# Patient Record
Sex: FEMALE | Race: OTHER | HISPANIC OR LATINO | URBAN - METROPOLITAN AREA
[De-identification: names, ages, dates, MRNs, and addresses within clinical notes are randomized per-mention and may not be internally consistent; named-entity substitution may affect disease eponyms.]

---

## 2024-10-09 NOTE — ASU PATIENT PROFILE, ADULT - NSICDXPASTSURGICALHX_GEN_ALL_CORE_FT
PAST SURGICAL HISTORY:  H/O hernia repair 37 years ago    S/P LASIK surgery      PAST SURGICAL HISTORY:  Bilateral cataracts     H/O hernia repair 37 years ago

## 2024-10-09 NOTE — ASU PATIENT PROFILE, ADULT - PAIN CHRONIC, PROFILE
We are committed to providing our patients with their test results in a timely manner. If you have access to [employer], you will receive your results within 5 to 7 days. If your results are normal, a member of our office may call you or you may receive a letter in the mail within 10 to 15 days of your testing. If your results have something additional to discuss, a member of our office will contact you by phone. If at any time you have questions related your results, please feel free to call our office at [phone]  
no

## 2024-10-09 NOTE — ASU PATIENT PROFILE, ADULT - NSICDXPASTMEDICALHX_GEN_ALL_CORE_FT
PAST MEDICAL HISTORY:  Arthritis with colds    Asthma     Migraines      PAST MEDICAL HISTORY:  Arthritis     Asthma with colds    Migraines

## 2024-10-10 ENCOUNTER — OUTPATIENT (OUTPATIENT)
Dept: INPATIENT UNIT | Facility: HOSPITAL | Age: 74
LOS: 1 days | End: 2024-10-10
Payer: MEDICARE

## 2024-10-10 VITALS
RESPIRATION RATE: 16 BRPM | WEIGHT: 154.1 LBS | SYSTOLIC BLOOD PRESSURE: 123 MMHG | DIASTOLIC BLOOD PRESSURE: 73 MMHG | HEART RATE: 77 BPM | OXYGEN SATURATION: 98 % | HEIGHT: 55 IN | TEMPERATURE: 98 F

## 2024-10-10 VITALS
SYSTOLIC BLOOD PRESSURE: 123 MMHG | TEMPERATURE: 98 F | DIASTOLIC BLOOD PRESSURE: 73 MMHG | HEIGHT: 55 IN | HEART RATE: 77 BPM | OXYGEN SATURATION: 98 % | WEIGHT: 154.1 LBS | RESPIRATION RATE: 16 BRPM

## 2024-10-10 DIAGNOSIS — H26.9 UNSPECIFIED CATARACT: Chronic | ICD-10-CM

## 2024-10-10 DIAGNOSIS — Z98.890 OTHER SPECIFIED POSTPROCEDURAL STATES: Chronic | ICD-10-CM

## 2024-10-10 LAB
BLD GP AB SCN SERPL QL: NEGATIVE — SIGNIFICANT CHANGE UP
RH IG SCN BLD-IMP: POSITIVE — SIGNIFICANT CHANGE UP

## 2024-10-10 PROCEDURE — 86850 RBC ANTIBODY SCREEN: CPT

## 2024-10-10 PROCEDURE — 86901 BLOOD TYPING SEROLOGIC RH(D): CPT

## 2024-10-10 PROCEDURE — 86900 BLOOD TYPING SEROLOGIC ABO: CPT

## 2024-10-10 RX ORDER — ACETAMINOPHEN 325 MG
2 TABLET ORAL
Refills: 0 | DISCHARGE

## 2024-10-10 NOTE — PRE-ANESTHESIA EVALUATION ADULT - NSANTHOSAYNRD_GEN_A_CORE
No. DENISA screening performed.  STOP BANG Legend: 0-2 = LOW Risk; 3-4 = INTERMEDIATE Risk; 5-8 = HIGH Risk

## 2024-10-15 PROBLEM — J45.909 UNSPECIFIED ASTHMA, UNCOMPLICATED: Chronic | Status: ACTIVE | Noted: 2024-10-09

## 2024-10-15 PROBLEM — M19.90 UNSPECIFIED OSTEOARTHRITIS, UNSPECIFIED SITE: Chronic | Status: ACTIVE | Noted: 2024-10-09

## 2024-10-15 PROBLEM — G43.909 MIGRAINE, UNSPECIFIED, NOT INTRACTABLE, WITHOUT STATUS MIGRAINOSUS: Chronic | Status: ACTIVE | Noted: 2024-10-09

## 2024-10-16 RX ORDER — ALBUTEROL 90 MCG
2 AEROSOL (GRAM) INHALATION
Refills: 0 | DISCHARGE

## 2024-10-16 RX ORDER — ATORVASTATIN CALCIUM 10 MG/1
1 TABLET, FILM COATED ORAL
Refills: 0 | DISCHARGE

## 2024-10-16 RX ORDER — MONTELUKAST SODIUM 10 MG/1
1 TABLET, FILM COATED ORAL
Refills: 0 | DISCHARGE

## 2024-10-17 ENCOUNTER — APPOINTMENT (OUTPATIENT)
Dept: GASTROENTEROLOGY | Facility: CLINIC | Age: 74
End: 2024-10-17